# Patient Record
Sex: MALE | URBAN - METROPOLITAN AREA
[De-identification: names, ages, dates, MRNs, and addresses within clinical notes are randomized per-mention and may not be internally consistent; named-entity substitution may affect disease eponyms.]

---

## 2023-03-24 ENCOUNTER — APPOINTMENT (OUTPATIENT)
Dept: URBAN - METROPOLITAN AREA CLINIC 241 | Age: 58
Setting detail: DERMATOLOGY
End: 2023-03-24

## 2023-03-24 DIAGNOSIS — Q82.5 CONGENITAL NON-NEOPLASTIC NEVUS: ICD-10-CM

## 2023-03-24 DIAGNOSIS — D49.2 NEOPLASM OF UNSPECIFIED BEHAVIOR OF BONE, SOFT TISSUE, AND SKIN: ICD-10-CM

## 2023-03-24 DIAGNOSIS — D18.0 HEMANGIOMA: ICD-10-CM

## 2023-03-24 DIAGNOSIS — L57.0 ACTINIC KERATOSIS: ICD-10-CM

## 2023-03-24 PROBLEM — D18.01 HEMANGIOMA OF SKIN AND SUBCUTANEOUS TISSUE: Status: ACTIVE | Noted: 2023-03-24

## 2023-03-24 PROCEDURE — OTHER LIQUID NITROGEN: OTHER

## 2023-03-24 PROCEDURE — OTHER COUNSELING: OTHER

## 2023-03-24 PROCEDURE — 17000 DESTRUCT PREMALG LESION: CPT | Mod: 59

## 2023-03-24 PROCEDURE — 11306 SHAVE SKIN LESION 0.6-1.0 CM: CPT

## 2023-03-24 PROCEDURE — 99203 OFFICE O/P NEW LOW 30 MIN: CPT | Mod: 25

## 2023-03-24 PROCEDURE — OTHER SHAVE REMOVAL: OTHER

## 2023-03-24 ASSESSMENT — LOCATION DETAILED DESCRIPTION DERM
LOCATION DETAILED: RIGHT INFERIOR VERMILION LIP
LOCATION DETAILED: LEFT MEDIAL FRONTAL SCALP
LOCATION DETAILED: LEFT SUPERIOR HELIX
LOCATION DETAILED: RIGHT INFERIOR FOREHEAD

## 2023-03-24 ASSESSMENT — LOCATION ZONE DERM
LOCATION ZONE: FACE
LOCATION ZONE: SCALP
LOCATION ZONE: EAR
LOCATION ZONE: LIP

## 2023-03-24 ASSESSMENT — LOCATION SIMPLE DESCRIPTION DERM
LOCATION SIMPLE: RIGHT FOREHEAD
LOCATION SIMPLE: LEFT EAR
LOCATION SIMPLE: LEFT SCALP
LOCATION SIMPLE: RIGHT LIP

## 2023-03-24 NOTE — PROCEDURE: LIQUID NITROGEN
Detail Level: Zone
Show Aperture Variable?: Yes
Application Tool (Optional): Cry-AC
Consent: The patient's consent was obtained including but not limited to risks of crusting, scabbing, blistering, scarring, darker or lighter pigmentary change, recurrence, incomplete removal and infection.
Number Of Freeze-Thaw Cycles: 2 freeze-thaw cycles
Duration Of Freeze Thaw-Cycle (Seconds): 5
Post-Care Instructions: I reviewed with the patient in detail post-care instructions. Patient is to wear sunprotection, and avoid picking at any of the treated lesions. Pt may apply Vaseline to crusted or scabbing areas.

## 2023-03-24 NOTE — PROCEDURE: SHAVE REMOVAL
Billing Type: Third-Party Bill
Hemostasis: Aluminum Chloride and Electrocautery
Depth Of Shave: dermis
Add Variable For Additional Medical Justification: No
Was A Bandage Applied: Yes
Medical Necessity Clause: This procedure was medically necessary because the lesion that was treated was:
Body Location Override (Optional - Billing Will Still Be Based On Selected Body Map Location If Applicable): vertex scalp
Post-Care Instructions: I reviewed with the patient in detail post-care instructions. Patient is to keep the biopsy site dry overnight, and then apply bacitracin twice daily until healed. Patient may apply hydrogen peroxide soaks to remove any crusting.
Anesthesia Type: 1% lidocaine with epinephrine
Size Of Lesion In Cm (Required): 1
Anesthesia Volume In Cc: 0.3
Notification Instructions: Patient will be notified of pathology results. However, patient instructed to call the office if not contacted within 2 weeks.
Medical Necessity Information: It is in your best interest to select a reason for this procedure from the list below. All of these items fulfill various CMS LCD requirements except the new and changing color options.
X Size Of Lesion In Cm (Optional): 0
Consent was obtained from the patient. The risks and benefits to therapy were discussed in detail. Specifically, the risks of infection, scarring, bleeding, prolonged wound healing, incomplete removal, allergy to anesthesia, nerve injury and recurrence were addressed. Prior to the procedure, the treatment site was clearly identified and confirmed by the patient. All components of Universal Protocol/PAUSE Rule completed.
Biopsy Method: Dermablade
Detail Level: Detailed
Wound Care: Petrolatum